# Patient Record
Sex: FEMALE | Race: WHITE | NOT HISPANIC OR LATINO | URBAN - METROPOLITAN AREA
[De-identification: names, ages, dates, MRNs, and addresses within clinical notes are randomized per-mention and may not be internally consistent; named-entity substitution may affect disease eponyms.]

---

## 2022-04-20 ENCOUNTER — OFFICE VISIT (OUTPATIENT)
Dept: URBAN - METROPOLITAN AREA CLINIC 94 | Facility: CLINIC | Age: 42
End: 2022-04-20
Payer: COMMERCIAL

## 2022-04-20 ENCOUNTER — WEB ENCOUNTER (OUTPATIENT)
Dept: URBAN - METROPOLITAN AREA CLINIC 94 | Facility: CLINIC | Age: 42
End: 2022-04-20

## 2022-04-20 ENCOUNTER — DASHBOARD ENCOUNTERS (OUTPATIENT)
Age: 42
End: 2022-04-20

## 2022-04-20 VITALS
HEART RATE: 68 BPM | WEIGHT: 173 LBS | DIASTOLIC BLOOD PRESSURE: 71 MMHG | HEIGHT: 70 IN | BODY MASS INDEX: 24.77 KG/M2 | SYSTOLIC BLOOD PRESSURE: 113 MMHG | TEMPERATURE: 97.7 F

## 2022-04-20 DIAGNOSIS — Z83.79 FAMILY HISTORY OF CROHN'S DISEASE: ICD-10-CM

## 2022-04-20 DIAGNOSIS — R10.13 EPIGASTRIC PAIN: ICD-10-CM

## 2022-04-20 DIAGNOSIS — D72.829 LEUKOCYTOSIS, UNSPECIFIED TYPE: ICD-10-CM

## 2022-04-20 DIAGNOSIS — R10.31 RLQ ABDOMINAL PAIN: ICD-10-CM

## 2022-04-20 PROCEDURE — 99204 OFFICE O/P NEW MOD 45 MIN: CPT | Performed by: INTERNAL MEDICINE

## 2022-04-20 NOTE — HPI-TODAY'S VISIT:
41 yo F evaluated today following recent [facility] ER visit for abdominal pain.   The patient reports around noon on April 15th developing epigastric pain. She reports sx became progressively worse and moved to the RLQ. She reports being doubled over in pain with chills. She present to [facility] ER CBC revealed WBC 20K Lactic Acid normal.  CT Multiseptated cyst in the left adnexa measuring 7.0 x 4.7 x 4.5 cm. US Prior hysterectomy and bilateral salpingectomy. Complex 4.2 cm and 2.6 cm left ovarian lesions, suspected physiologic/hemorrhagic cysts. Unremarkable right ovary Pt dischard on Dicyclomine.   Pt states that since ER visit, she still has RLQ pain but not as severe. Pt denies changes in bowel habits. She denies melena or hematochezia. Pt denies hx of acid reflux or N/V.  Pt reports a family hx of paternal aunts x 2 with Crohn's disease and father having diverticulitis

## 2022-04-20 NOTE — PHYSICAL EXAM GASTROINTESTINAL
Abdomen , soft, epigastric and RLQ tender, nondistended , no guarding or rigidity , no masses palpable , normal bowel sounds , Liver and Spleen , no hepatomegaly present , no hepatosplenomegaly , liver nontender , spleen not palpable

## 2022-04-22 LAB
BASO (ABSOLUTE): 0.1
BASOS: 1
C-REACTIVE PROTEIN, QUANT: 6
ENDOMYSIAL ANTIBODY IGA: NEGATIVE
EOS (ABSOLUTE): 0.1
EOS: 1
HEMATOCRIT: 40.8
HEMATOLOGY COMMENTS:: (no result)
HEMOGLOBIN: 13.2
IMMATURE CELLS: (no result)
IMMATURE GRANS (ABS): 0.1
IMMATURE GRANULOCYTES: 1
IMMUNOGLOBULIN A, QN, SERUM: 180
LYMPHS (ABSOLUTE): 3.6
LYMPHS: 38
MCH: 29.3
MCHC: 32.4
MCV: 91
MONOCYTES(ABSOLUTE): 0.7
MONOCYTES: 8
NEUTROPHILS (ABSOLUTE): 5
NEUTROPHILS: 51
NRBC: (no result)
PLATELETS: 367
RBC: 4.5
RDW: 12.3
SEDIMENTATION RATE-WESTERGREN: 7
T-TRANSGLUTAMINASE (TTG) IGA: <2
WBC: 9.5

## 2022-05-12 PROBLEM — 111583006: Status: ACTIVE | Noted: 2022-04-20

## 2022-05-23 ENCOUNTER — OFFICE VISIT (OUTPATIENT)
Dept: URBAN - METROPOLITAN AREA SURGERY CENTER 17 | Facility: SURGERY CENTER | Age: 42
End: 2022-05-23

## 2022-06-13 ENCOUNTER — OFFICE VISIT (OUTPATIENT)
Dept: URBAN - METROPOLITAN AREA CLINIC 94 | Facility: CLINIC | Age: 42
End: 2022-06-13